# Patient Record
(demographics unavailable — no encounter records)

---

## 2017-04-06 NOTE — RAD
RIGHT KNEE TWO VIEWS

4/5/17

 

HISTORY: 

Right knee pain and instability.

 

FINDINGS:  

There are mild degenerative changes in the knee joint manifested by osteophyte formation and joint s
pace narrowing. Chondrocalcinosis is present. No fracture, dislocation, or bony destruction is ident
ified. Ossific densities in the suprapatellar joint space consistent with synovial osteochondromatos
is. 

 

IMPRESSION:  

1.      Right knee osteoarthritis.

2.      Synovial osteochondromatosis of the right knee.

3.      Chondrocalcinosis. 

 

POS: [facility]

## 2017-04-06 NOTE — RAD
LUMBAR SPINE THREE VIEWS:

4/5/17

 

HISTORY: 

63-year-old male with back pain and disability evaluation. 

 

Postop laminectomy changes at L5 with prominent left lateral disc osteophyte at L5-S1. Extensive mul
tilevel spondylosis with disc osteophytosis and facet arthrosis. No acute fracture. Stable appearanc
e from 10/29/14. 

 

IMPRESSION:  

Extensive spondylosis. Postoperative laminectomy at L5. Stable. 

 

[facility]: [facility]

## 2018-10-23 NOTE — HP
HISTORY OF PRESENT ILLNESS:  Mr. Joya is a 64-year-old man known to us for previous lumbar decompr
ession and evaluations for lumbar disk disease and back pain.  He returns now with increasing neuroge
lilian claudication symptoms and axial back pain with a new MRI from [facility] that reveals severe central ca
nal stenosis at L2-L3.  He has had this treated in the past with tramadol therapy, no injections ____
_ around but does not feel a great desire to pursue those.

 

PAST MEDICAL HISTORY:  Osteoarthritis, gout, hypertension, GERD, anxiety, degenerative joint disease 
of the lumbar spine.

 

PAST SURGICAL HISTORY:  Lumbar laminectomy x2, unspecified right elbow.

 

CURRENT MEDICATIONS:  Norco, _____, atenolol, amlodipine, alprazolam, ranitidine, aspirin, Flexeril, 
tramadol, and Naproxen.

 

ALLERGIES:  No known drug allergies.

 

PHYSICAL EXAMINATION:  The patient is alert and oriented x3.  Gait is severely antalgic and stooped. 
 He is using a cane to help with ambulation.

 

ASSESSMENT:  Lumbar spinal stenosis.

 

PLAN:  Dr. Medina met with the patient, reviewed imaging and advocated for L2-3 decompression.  He exp
lained to the patient the risks, benefits and alternatives of the procedure.  The patient expressed u
nderstanding and would like to move forward with surgery as discussed.  I do believe the patient is m
entally competent and capable of making medical decisions for himself.  We will move forward with declan garza as planned.

## 2018-10-25 NOTE — OP
DATE OF PROCEDURE:  10/24/2018

 

SURGEON:  Farhad Medina M.D.

 

FIRST ASSIST:  Michele Lopez PA-C

 

INDICATION:  Pain.

 

DIAGNOSIS:  Lumbar stenosis.

 

PROCEDURE:  L2-L3 lumbar decompression.

 

ANESTHESIA:  General.

 

TECHNIQUE:  The patient was brought into the operating room and placed under anesthesia.  He was flip
ped from a supine to prone position on the operating room table.  A linear incision was planned over 
the L2-3.  After prepping and draping and after an appropriate operative pause, the incision was crea
oumar.  The soft tissues were swept away from midline.  A self-retaining retractor was placed in the wo
und for optimal exposure.  After confirming the appropriate level with C-arm fluoroscopy, an Alibabaon ro
ngeur as well as a high-speed cutting drill bit and 2 and 3 and 4 mm Kerrison was used to perform a l
aminectomy along the L2-3 segment.  After complete decompression of this region, the wound was irriga
oumar.  Hemostasis was maintained throughout.  The wound was then closed in anatomic layers and a press
ure dressing was applied.  There were no known procedural complications.

## 2018-12-20 NOTE — RAD
LUMBAR SPINE FOUR VIEWS:

 

History: 

64-year-old male with history of spondylosis of the lumbar region .

 

Comparison: 

4-5-17

 

FINDINGS: 

Laminectomy changes at L5 and L2-3. There is some retrolisthesis of L1 on L2 without evidence for tra
nslation between flexion and extension, stable from prior exam. Generalized disc osteophytosis and fa
cet arthrosis. 

 

IMPRESSION: 

Multilevel laminectomy. Spondylosis with some retrolisthesis of L1 on L2. No acute process. 

 

POS: [facility]

## 2019-02-20 NOTE — RAD
LUMBAR SPINE THREE VIEWS:

 

HISTORY:

Spondylosis of the spine.

 

COMPARISON:

None.

 

FINDINGS:

There appear to be five lumbar type vertebral bodies.  In the neutral position, 4.5 mm of retrolisthe
sis of L1 upon L2.  Upon flexion, 3.7 mm of retrolisthesis of L1 upon L2.  Upon extension, 4.8 mm of 
retrolisthesis of L1 upon L2.  Upon extension, 4.8 mm of retrolisthesis of L1 upon L2.  Additional le
vels of spondylosis are not noted.

 

IMPRESSION:

Grade 1 retrolisthesis L1 upon L2.

 

POS: [facility]